# Patient Record
Sex: FEMALE | Race: WHITE | Employment: OTHER | URBAN - NONMETROPOLITAN AREA
[De-identification: names, ages, dates, MRNs, and addresses within clinical notes are randomized per-mention and may not be internally consistent; named-entity substitution may affect disease eponyms.]

---

## 2017-03-07 ENCOUNTER — TELEPHONE (OUTPATIENT)
Dept: AUDIOLOGY | Age: 82
End: 2017-03-07

## 2019-02-13 ENCOUNTER — APPOINTMENT (OUTPATIENT)
Dept: CT IMAGING | Age: 84
End: 2019-02-13
Payer: MEDICARE

## 2019-02-13 ENCOUNTER — APPOINTMENT (OUTPATIENT)
Dept: GENERAL RADIOLOGY | Age: 84
End: 2019-02-13
Payer: MEDICARE

## 2019-02-13 ENCOUNTER — HOSPITAL ENCOUNTER (EMERGENCY)
Age: 84
Discharge: HOME | End: 2019-02-13
Payer: MEDICARE

## 2019-02-13 VITALS
OXYGEN SATURATION: 95 % | HEIGHT: 67 IN | BODY MASS INDEX: 23.07 KG/M2 | HEART RATE: 74 BPM | TEMPERATURE: 98 F | DIASTOLIC BLOOD PRESSURE: 54 MMHG | RESPIRATION RATE: 20 BRPM | SYSTOLIC BLOOD PRESSURE: 104 MMHG | WEIGHT: 147 LBS
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
## 2019-02-13 DIAGNOSIS — K08.89 PAIN, DENTAL: ICD-10-CM

## 2019-02-13 LAB
ANION GAP SERPL CALCULATED.3IONS-SCNC: 13 MEQ/L (ref 8–16)
BACTERIA: ABNORMAL /HPF
BASOPHILS # BLD: 0.3 %
BASOPHILS ABSOLUTE: 0 THOU/MM3 (ref 0–0.1)
BILIRUBIN URINE: NEGATIVE
BLOOD, URINE: NEGATIVE
BUN BLDV-MCNC: 19 MG/DL (ref 7–22)
CALCIUM SERPL-MCNC: 9.2 MG/DL (ref 8.5–10.5)
CASTS 2: ABNORMAL /LPF
CASTS UA: ABNORMAL /LPF
CHARACTER, URINE: CLEAR
CHLORIDE BLD-SCNC: 103 MEQ/L (ref 98–111)
CO2: 24 MEQ/L (ref 23–33)
COLOR: YELLOW
CREAT SERPL-MCNC: 1 MG/DL (ref 0.4–1.2)
CRYSTALS, UA: ABNORMAL
EKG ATRIAL RATE: 83 BPM
EKG P AXIS: 48 DEGREES
EKG P-R INTERVAL: 180 MS
EKG Q-T INTERVAL: 414 MS
EKG QRS DURATION: 122 MS
EKG QTC CALCULATION (BAZETT): 486 MS
EKG R AXIS: -86 DEGREES
EKG T AXIS: -19 DEGREES
EKG VENTRICULAR RATE: 83 BPM
EOSINOPHIL # BLD: 0.2 %
EOSINOPHILS ABSOLUTE: 0 THOU/MM3 (ref 0–0.4)
EPITHELIAL CELLS, UA: ABNORMAL /HPF
ERYTHROCYTE [DISTWIDTH] IN BLOOD BY AUTOMATED COUNT: 12.5 % (ref 11.5–14.5)
ERYTHROCYTE [DISTWIDTH] IN BLOOD BY AUTOMATED COUNT: 41 FL (ref 35–45)
GFR SERPL CREATININE-BSD FRML MDRD: 53 ML/MIN/1.73M2
GLUCOSE BLD-MCNC: 149 MG/DL (ref 70–108)
GLUCOSE URINE: NEGATIVE MG/DL
HCT VFR BLD CALC: 36.1 % (ref 37–47)
HEMOGLOBIN: 12 GM/DL (ref 12–16)
IMMATURE GRANS (ABS): 0.08 THOU/MM3 (ref 0–0.07)
IMMATURE GRANULOCYTES: 0.6 %
KETONES, URINE: NEGATIVE
LEUKOCYTE ESTERASE, URINE: NEGATIVE
LYMPHOCYTES # BLD: 5.4 %
LYMPHOCYTES ABSOLUTE: 0.8 THOU/MM3 (ref 1–4.8)
MCH RBC QN AUTO: 30 PG (ref 26–33)
MCHC RBC AUTO-ENTMCNC: 33.2 GM/DL (ref 32.2–35.5)
MCV RBC AUTO: 90.3 FL (ref 81–99)
MISCELLANEOUS 2: ABNORMAL
MONOCYTES # BLD: 10.9 %
MONOCYTES ABSOLUTE: 1.6 THOU/MM3 (ref 0.4–1.3)
NITRITE, URINE: NEGATIVE
NUCLEATED RED BLOOD CELLS: 0 /100 WBC
OSMOLALITY CALCULATION: 284.5 MOSMOL/KG (ref 275–300)
PH UA: 6
PLATELET # BLD: 178 THOU/MM3 (ref 130–400)
PMV BLD AUTO: 8.9 FL (ref 9.4–12.4)
POTASSIUM SERPL-SCNC: 4 MEQ/L (ref 3.5–5.2)
PROTEIN UA: 30
RBC # BLD: 4 MILL/MM3 (ref 4.2–5.4)
RBC URINE: ABNORMAL /HPF
RENAL EPITHELIAL, UA: ABNORMAL
SEG NEUTROPHILS: 82.6 %
SEGMENTED NEUTROPHILS ABSOLUTE COUNT: 11.9 THOU/MM3 (ref 1.8–7.7)
SODIUM BLD-SCNC: 140 MEQ/L (ref 135–145)
SPECIFIC GRAVITY, URINE: 1.01 (ref 1–1.03)
UROBILINOGEN, URINE: 0.2 EU/DL
WBC # BLD: 14.4 THOU/MM3 (ref 4.8–10.8)
WBC UA: ABNORMAL /HPF
YEAST: ABNORMAL

## 2019-02-13 PROCEDURE — 99285 EMERGENCY DEPT VISIT HI MDM: CPT

## 2019-02-13 PROCEDURE — 85025 COMPLETE CBC W/AUTO DIFF WBC: CPT

## 2019-02-13 PROCEDURE — 6370000000 HC RX 637 (ALT 250 FOR IP): Performed by: PHYSICIAN ASSISTANT

## 2019-02-13 PROCEDURE — 80048 BASIC METABOLIC PNL TOTAL CA: CPT

## 2019-02-13 PROCEDURE — 71045 X-RAY EXAM CHEST 1 VIEW: CPT

## 2019-02-13 PROCEDURE — 36415 COLL VENOUS BLD VENIPUNCTURE: CPT

## 2019-02-13 PROCEDURE — 70486 CT MAXILLOFACIAL W/O DYE: CPT

## 2019-02-13 PROCEDURE — 70450 CT HEAD/BRAIN W/O DYE: CPT

## 2019-02-13 PROCEDURE — P9612 CATHETERIZE FOR URINE SPEC: HCPCS

## 2019-02-13 PROCEDURE — 93005 ELECTROCARDIOGRAM TRACING: CPT | Performed by: PHYSICIAN ASSISTANT

## 2019-02-13 PROCEDURE — 81001 URINALYSIS AUTO W/SCOPE: CPT

## 2019-02-13 PROCEDURE — 72125 CT NECK SPINE W/O DYE: CPT

## 2019-02-13 RX ORDER — ACETAMINOPHEN 325 MG/1
650 TABLET ORAL ONCE
Status: COMPLETED | OUTPATIENT
Start: 2019-02-13 | End: 2019-02-13

## 2019-02-13 RX ORDER — PENICILLIN V POTASSIUM 500 MG/1
500 TABLET ORAL 4 TIMES DAILY
Qty: 40 TABLET | Refills: 0 | Status: SHIPPED | OUTPATIENT
Start: 2019-02-13 | End: 2019-02-23

## 2019-02-13 RX ADMIN — ACETAMINOPHEN 650 MG: 325 TABLET ORAL at 06:22

## 2019-02-13 ASSESSMENT — ENCOUNTER SYMPTOMS
DIARRHEA: 0
COUGH: 1
NAUSEA: 0
RHINORRHEA: 0
WHEEZING: 0
ABDOMINAL PAIN: 0
BACK PAIN: 0
SORE THROAT: 0
VOMITING: 0
SHORTNESS OF BREATH: 0

## 2019-02-14 PROCEDURE — 93010 ELECTROCARDIOGRAM REPORT: CPT | Performed by: INTERNAL MEDICINE

## 2020-02-07 ENCOUNTER — APPOINTMENT (OUTPATIENT)
Dept: GENERAL RADIOLOGY | Age: 85
End: 2020-02-07
Payer: MEDICARE

## 2020-02-07 ENCOUNTER — HOSPITAL ENCOUNTER (EMERGENCY)
Age: 85
Discharge: HOME | End: 2020-02-07
Attending: EMERGENCY MEDICINE
Payer: MEDICARE

## 2020-02-07 VITALS
DIASTOLIC BLOOD PRESSURE: 70 MMHG | HEART RATE: 82 BPM | HEIGHT: 65 IN | TEMPERATURE: 98.3 F | BODY MASS INDEX: 25.83 KG/M2 | RESPIRATION RATE: 17 BRPM | WEIGHT: 155 LBS | OXYGEN SATURATION: 96 % | SYSTOLIC BLOOD PRESSURE: 125 MMHG

## 2020-02-07 LAB
BACTERIA: ABNORMAL
BILIRUBIN URINE: ABNORMAL
BLOOD, URINE: NEGATIVE
CASTS: ABNORMAL /LPF
CASTS: ABNORMAL /LPF
CHARACTER, URINE: CLEAR
COLOR: ABNORMAL
CRYSTALS: ABNORMAL
EKG ATRIAL RATE: 66 BPM
EKG P AXIS: 42 DEGREES
EKG P-R INTERVAL: 198 MS
EKG Q-T INTERVAL: 448 MS
EKG QRS DURATION: 126 MS
EKG QTC CALCULATION (BAZETT): 469 MS
EKG R AXIS: -72 DEGREES
EKG T AXIS: -6 DEGREES
EKG VENTRICULAR RATE: 66 BPM
EPITHELIAL CELLS, UA: ABNORMAL /HPF
FLU A ANTIGEN: NEGATIVE
FLU B ANTIGEN: NEGATIVE
GLUCOSE, URINE: NEGATIVE MG/DL
ICTOTEST: NEGATIVE
KETONES, URINE: ABNORMAL
LEUKOCYTE EST, POC: NEGATIVE
MISCELLANEOUS LAB TEST RESULT: ABNORMAL
NITRITE, URINE: NEGATIVE
PH UA: 5 (ref 5–9)
PROTEIN UA: 30 MG/DL
RBC URINE: ABNORMAL /HPF
RENAL EPITHELIAL, UA: ABNORMAL
SPECIFIC GRAVITY UA: 1.03 (ref 1–1.03)
UROBILINOGEN, URINE: 1 EU/DL (ref 0–1)
WBC UA: ABNORMAL /HPF
YEAST: ABNORMAL

## 2020-02-07 PROCEDURE — 99284 EMERGENCY DEPT VISIT MOD MDM: CPT

## 2020-02-07 PROCEDURE — 2709999900 HC NON-CHARGEABLE SUPPLY

## 2020-02-07 PROCEDURE — 71046 X-RAY EXAM CHEST 2 VIEWS: CPT

## 2020-02-07 PROCEDURE — 93005 ELECTROCARDIOGRAM TRACING: CPT | Performed by: EMERGENCY MEDICINE

## 2020-02-07 PROCEDURE — 87804 INFLUENZA ASSAY W/OPTIC: CPT

## 2020-02-07 PROCEDURE — 81001 URINALYSIS AUTO W/SCOPE: CPT

## 2020-02-07 ASSESSMENT — ENCOUNTER SYMPTOMS
RECTAL PAIN: 0
SHORTNESS OF BREATH: 0
NAUSEA: 0
BACK PAIN: 0
CONSTIPATION: 0
COUGH: 1
VOMITING: 0
DIARRHEA: 0
EYE PAIN: 0
SINUS PAIN: 0
BLOOD IN STOOL: 0
SINUS PRESSURE: 0
ABDOMINAL PAIN: 0
CHEST TIGHTNESS: 0
WHEEZING: 1

## 2020-02-07 NOTE — ED PROVIDER NOTES
 CAROTID ENDARTERECTOMY Right 3/19/2014    COLONOSCOPY      CRANIOTOMY  5/9/15    Dr. Robert Disla  5/15    HYSTERECTOMY  1989    UPPER GASTROINTESTINAL ENDOSCOPY         MEDICATIONS   Scheduled Meds:  Continuous Infusions:  PRN Meds:. ALLERGIES:     Allergies   Allergen Reactions    Morphine Nausea And Vomiting       FAMILY HISTORY:     Family History   Problem Relation Age of Onset    High Blood Pressure Mother     Stroke Mother         TIA    Other Father         peritonitis    Cancer Father         tumor on colon       SOCIAL HISTORY:     Social History     Tobacco Use    Smoking status: Never Smoker    Smokeless tobacco: Never Used   Substance Use Topics    Alcohol use: No     Comment: occasional beer     Pt is a resident at [address] [place] [address] St:   Pt presented to ED on 2019 for evaluation after a fall. Audelia Santoyo VITAL SIGNS   CURRENT VITALS:  height is 5' 5\" (1.651 m) and weight is 155 lb (70.3 kg). Her oral temperature is 98.3 °F (36.8 °C). Her blood pressure is 128/67 and her pulse is 69. Her respiration is 20 and oxygen saturation is 96%. Temperature Range (24h):Temp: 98.3 °F (36.8 °C) Temp  Av.3 °F (36.8 °C)  Min: 98.3 °F (36.8 °C)  Max: 98.3 °F (36.8 °C)  BP Range (06W): Systolic (02IKN), SAD:919 , Min:128 , UWW:626     Diastolic (23HHI), OJM:23, Min:67, Max:67    Pulse Range (24h): Pulse  Av  Min: 69  Max: 69  Respiration Range (24h): Resp  Av  Min: 20  Max: 20  Current Pulse Ox (24h):  SpO2: 96 %  Pulse Ox Range (24h):  SpO2  Av %  Min: 96 %  Max: 96 %  Oxygen Amount and Delivery:    Vital signs are normal. Pulsoximetry is normal.    PHYSICAL EXAM:   Physical Exam  Constitutional:       General: She is not in acute distress. Appearance: Normal appearance. Cardiovascular:      Rate and Rhythm: Normal rate and regular rhythm. Heart sounds: Normal heart sounds. No murmur. No friction rub. No gallop.     Pulmonary:

## 2020-02-07 NOTE — ED NOTES
Maddie ASHBY at [facility] called and updated on pt's discharged. [facility] transport called and stated they will be to pick pt up in 15 minutes.      Jeet Fraser RN  02/07/20 [id number]

## 2020-02-07 NOTE — ED PROVIDER NOTES
[address] COMPLAINT       Chief Complaint   Patient presents with    Cough     History obtained from the patient and nursing home records. HISTORY OF PRESENT ILLNESS    HPI  Roxane Person is a 80 y.o. female who presents to the emergency department for evaluation of productive cough with intermittent yellow sputum that she states has been persistent for months, currently in no pain. She reports intermittent wheezes. Patient also report urinary frequency and bladder incontinence, described as feeling urine coming out when coughing. Patient denies fever and chills. She denies chest pain and shortness of breath. Patient denies abdominal pain, nausea, vomiting, diarrhea and constipation. She denies hematuria and dysuria. Patient has a past medical history of CVA, GERD, HLD, HTN, and Osteopenia. The patient has no other acute complaints at this time. REVIEW OF SYSTEMS   Review of Systems   Constitutional: Negative for chills, fever and unexpected weight change. HENT: Negative for congestion, ear pain, nosebleeds, sinus pressure and sinus pain. Eyes: Negative for pain. Respiratory: Positive for cough and wheezing. Negative for chest tightness and shortness of breath. Cardiovascular: Negative for chest pain. Gastrointestinal: Negative for abdominal pain, blood in stool, constipation, diarrhea, nausea, rectal pain and vomiting. Endocrine: Negative for polydipsia and polyuria. Genitourinary: Positive for frequency. Negative for dysuria, flank pain and hematuria. Urinary incontinence    Musculoskeletal: Negative for arthralgias, back pain, myalgias and neck pain. Skin: Negative for rash. Neurological: Negative for tremors, seizures, weakness and headaches. All other systems reviewed and are negative.         PAST MEDICAL AND SURGICAL HISTORY     Past Medical History:   Diagnosis Date    Arthritis     [facility]     ovarian, right breast    CVA (cerebral infarction)     01/14    [facility] 2014    Dementia [facility]     Generalized anxiety disorder 2/7/2014    GERD (gastroesophageal reflux disease)     H/O craniotomy 05/2015    Hyperlipidemia     Hypertension     Osteopenia     Stress incontinence     Unspecified cerebral artery occlusion with cerebral infarction      Past Surgical History:   Procedure Laterality Date    BREAST SURGERY Right     BREAST BIOPSY    CAROTID ENDARTERECTOMY Right 3/19/2014    COLONOSCOPY      CRANIOTOMY  5/9/15    Dr. Fatou Redding  5/15    HYSTERECTOMY  1989    UPPER GASTROINTESTINAL ENDOSCOPY           MEDICATIONS   No current facility-administered medications for this encounter.      Current Outpatient Medications:     acetaminophen (TYLENOL) 325 MG tablet, Take 2 tablets by mouth three times daily, Disp: 120 tablet, Rfl: 3    haloperidol lactate (HALDOL) 5 MG/ML injection, Inject 1 mL into the muscle every 6 hours as needed for Agitation, Disp: 2 mL, Rfl: 0    QUEtiapine (SEROQUEL) 50 MG tablet, Take 1 tablet by mouth every morning, Disp: 60 tablet, Rfl: 3    QUEtiapine (SEROQUEL) 25 MG tablet, Take 3 tablets by mouth nightly, Disp: 60 tablet, Rfl: 3    diclofenac sodium 1 % GEL, Apply 2 g topically 3 times daily, Disp: 1 Tube, Rfl: 3    lidocaine (LIDODERM) 5 %, Place 2 patches onto the skin daily 12 hours on, 12 hours off., Disp: 30 patch, Rfl: 0    donepezil (ARICEPT) 10 MG tablet, Take 1 tablet by mouth nightly, Disp: 30 tablet, Rfl: 3    atorvastatin (LIPITOR) 10 MG tablet, Take 1 tablet by mouth nightly, Disp: , Rfl:     LACTOBACILLUS PO, Take 1 tablet by mouth daily, Disp: , Rfl:     metoprolol (LOPRESSOR) 25 MG tablet, Take 25 mg by mouth daily, Disp: , Rfl:     senna (SENOKOT) 8.6 MG tablet, Take 2 tablets by mouth nightly, Disp: , Rfl:     vitamin D (CHOLECALCIFEROL) 400 UNITS TABS tablet, Take 400 Units by mouth daily, Disp: , Rfl:     Cranberry 450 MG CAPS, Take by mouth 2 times daily, Disp: , Rfl:     Polyethylene Glycol 3350 (MIRALAX PO), Take by mouth daily as needed, Disp: , Rfl:     ferrous sulfate 325 (65 FE) MG tablet, Take 325 mg by mouth daily (with breakfast), Disp: , Rfl:     omeprazole (PRILOSEC) 20 MG capsule, Take 20 mg by mouth daily To administer: mix with orange or apple juice then flush with more juice after administration , Disp: , Rfl:     sertraline (ZOLOFT) 100 MG tablet,  Take 100 mg by mouth nightly , Disp: , Rfl:     bisacodyl (DULCOLAX) 10 MG suppository, Insert rectally every 24 hours PRN constipation, Disp: , Rfl:     traZODone (DESYREL) 50 MG tablet, Take 1 tablet by mouth nightly. (Patient taking differently: by PEG Tube route 25-50mg daily PRN and QHS PRN), Disp: 90 tablet, Rfl: 3    docusate sodium (COLACE, DULCOLAX) 100 MG CAPS, Take 100 mg by mouth 2 times daily. (Patient taking differently: Take 150 mg by mouth as needed ), Disp: , Rfl:     Multiple Vitamins-Minerals (THERAPEUTIC MULTIVITAMIN-MINERALS) tablet, Take 1 tablet by mouth daily. , Disp: , Rfl:       SOCIAL HISTORY     Social History     Patient does not qualify to have social determinant information on file (likely too young). Social History Narrative    Not on file     Social History     Tobacco Use    Smoking status: Never Smoker    Smokeless tobacco: Never Used   Substance Use Topics    Alcohol use: No     Comment: occasional beer    Drug use: No         ALLERGIES     Allergies   Allergen Reactions    Morphine Nausea And Vomiting         FAMILY HISTORY     Family History   Problem Relation Age of Onset    High Blood Pressure Mother     Stroke Mother         TIA    Other Father         peritonitis    Cancer Father         tumor on colon         PREVIOUS RECORDS   Previous records reviewed: Patient was evaluated 2/13/19 following at fall at the [facility].    Patient was evaluated in the ED on

## 2023-07-10 ENCOUNTER — HOSPITAL ENCOUNTER (OUTPATIENT)
Dept: AUDIOLOGY | Age: 88
Discharge: HOME | End: 2023-07-10
Payer: MEDICARE

## 2023-07-10 PROCEDURE — 92557 COMPREHENSIVE HEARING TEST: CPT | Performed by: AUDIOLOGIST

## 2023-07-10 PROCEDURE — 92567 TYMPANOMETRY: CPT | Performed by: AUDIOLOGIST

## 2023-07-10 NOTE — PROGRESS NOTES
AUDIOLOGICAL EVALUATION      REASON FOR TESTING: Audiometric evaluation per the request of Dr. Antoinette Huber, due to the diagnosis of bilateral hearing loss. Ashley Zelaya was accompanied to today's appointment by her  from her assisted living facility [facility]. She denied tinnitus, dizziness and aural pressure. Patient states that she has tried a BTE hearing aid and an ITC. She prefers the [facility] because it is easier for her to place in the ear. She reports having a stroke and it caused poor mobility of her left arm and hand. OTOSCOPY: Moderate amount of cerumen without visualization of the tympanic membrane for the left ear. Cerumen management was performed and minimal cerumen remained in the canal with slight redness. Minimal non-occluding cerumen with healthy appearing tympanic membrane for the right ear. AUDIOGRAM          Reliability: Good    COMMENTS:  Moderate to severe hearing loss for the left ear. Moderate to profound hearing loss for the right ear . Speech reception threshold was obtained at 70 dBHL for the left ear and 60 dBHL for the right ear. Word recognition ability is poor 36% for the left ear and 24% for the right. Tympanometry revealed normal peak pressure and normal middle ear compliance for both ears. NEW HEARING AID CONSULTATION:  Available hearing aid styles, technologies and options were discussed. The patient is interested in pursuing [place]. A right earmold impression was taken without incidence. Patients power of , Araceli Simmonds her son will be contacted to approve purchase. RECOMMENDATION(S):   1- Continue care with Dr. Antoinette Huber, as scheduled. 2- Binaural amplification was recommended; however due to patient's concerns of cost only a right hearing aid will be pursued after approved by her son, Araceli Simmonds. 3- Repeat audiogram and tympanogram if any changes are noted in hearing ability.      Testing completed by ANANYA Fitch under the direct supervision of Ca Monte

## 2023-07-24 ENCOUNTER — TELEPHONE (OUTPATIENT)
Dept: AUDIOLOGY | Age: 88
End: 2023-07-24

## 2024-01-08 ENCOUNTER — HOSPITAL ENCOUNTER (INPATIENT)
Age: 89
LOS: 2 days | Discharge: HOME | DRG: 689 | End: 2024-01-10
Attending: FAMILY MEDICINE | Admitting: INTERNAL MEDICINE
Payer: MEDICARE

## 2024-01-08 ENCOUNTER — APPOINTMENT (OUTPATIENT)
Dept: GENERAL RADIOLOGY | Age: 89
DRG: 689 | End: 2024-01-08
Payer: MEDICARE

## 2024-01-08 DIAGNOSIS — N39.0 COMPLICATED URINARY TRACT INFECTION: Primary | ICD-10-CM

## 2024-01-08 LAB
ALBUMIN SERPL BCG-MCNC: 4 G/DL (ref 3.5–5.1)
ALP SERPL-CCNC: 72 U/L (ref 38–126)
ALT SERPL W/O P-5'-P-CCNC: 13 U/L (ref 11–66)
ANION GAP SERPL CALC-SCNC: 12 MEQ/L (ref 8–16)
AST SERPL-CCNC: 22 U/L (ref 5–40)
BACTERIA URNS QL MICRO: ABNORMAL /HPF
BASOPHILS ABSOLUTE: 0.1 THOU/MM3 (ref 0–0.1)
BASOPHILS NFR BLD AUTO: 0.6 %
BILIRUB SERPL-MCNC: 0.2 MG/DL (ref 0.3–1.2)
BILIRUB UR QL STRIP.AUTO: NEGATIVE
BUN SERPL-MCNC: 16 MG/DL (ref 7–22)
CALCIUM SERPL-MCNC: 9.1 MG/DL (ref 8.5–10.5)
CASTS #/AREA URNS LPF: ABNORMAL /LPF
CASTS 2: ABNORMAL /LPF
CHARACTER UR: ABNORMAL
CHLORIDE SERPL-SCNC: 105 MEQ/L (ref 98–111)
CO2 SERPL-SCNC: 23 MEQ/L (ref 23–33)
COLOR: YELLOW
CREAT SERPL-MCNC: 0.9 MG/DL (ref 0.4–1.2)
CRYSTALS URNS MICRO: ABNORMAL
DEPRECATED RDW RBC AUTO: 42.4 FL (ref 35–45)
EOSINOPHIL NFR BLD AUTO: 5.3 %
EOSINOPHILS ABSOLUTE: 0.5 THOU/MM3 (ref 0–0.4)
EPITHELIAL CELLS, UA: ABNORMAL /HPF
ERYTHROCYTE [DISTWIDTH] IN BLOOD BY AUTOMATED COUNT: 12.8 % (ref 11.5–14.5)
FLUAV RNA RESP QL NAA+PROBE: NOT DETECTED
FLUBV RNA RESP QL NAA+PROBE: NOT DETECTED
GFR SERPL CREATININE-BSD FRML MDRD: 60 ML/MIN/1.73M2
GLUCOSE SERPL-MCNC: 116 MG/DL (ref 70–108)
GLUCOSE UR QL STRIP.AUTO: NEGATIVE MG/DL
HCT VFR BLD AUTO: 37.3 % (ref 37–47)
HGB BLD-MCNC: 12.4 GM/DL (ref 12–16)
HGB UR QL STRIP.AUTO: NEGATIVE
IMM GRANULOCYTES # BLD AUTO: 0.05 THOU/MM3 (ref 0–0.07)
IMM GRANULOCYTES NFR BLD AUTO: 0.5 %
KETONES UR QL STRIP.AUTO: NEGATIVE
LACTIC ACID, SEPSIS: 1.8 MMOL/L (ref 0.5–1.9)
LYMPHOCYTES ABSOLUTE: 1.9 THOU/MM3 (ref 1–4.8)
LYMPHOCYTES NFR BLD AUTO: 19.7 %
MCH RBC QN AUTO: 30.2 PG (ref 26–33)
MCHC RBC AUTO-ENTMCNC: 33.2 GM/DL (ref 32.2–35.5)
MCV RBC AUTO: 91 FL (ref 81–99)
MISCELLANEOUS 2: ABNORMAL
MONOCYTES ABSOLUTE: 0.9 THOU/MM3 (ref 0.4–1.3)
MONOCYTES NFR BLD AUTO: 9.2 %
NEUTROPHILS NFR BLD AUTO: 64.7 %
NITRITE UR QL STRIP: POSITIVE
NRBC BLD AUTO-RTO: 0 /100 WBC
OSMOLALITY SERPL CALC.SUM OF ELEC: 281.6 MOSMOL/KG (ref 275–300)
PH UR STRIP.AUTO: 5.5 [PH] (ref 5–9)
PLATELET # BLD AUTO: 217 THOU/MM3 (ref 130–400)
PMV BLD AUTO: 8.6 FL (ref 9.4–12.4)
POTASSIUM SERPL-SCNC: 4 MEQ/L (ref 3.5–5.2)
PROCALCITONIN SERPL IA-MCNC: 0.05 NG/ML (ref 0.01–0.09)
PROT SERPL-MCNC: 6.3 G/DL (ref 6.1–8)
PROT UR STRIP.AUTO-MCNC: ABNORMAL MG/DL
RBC # BLD AUTO: 4.1 MILL/MM3 (ref 4.2–5.4)
RBC URINE: ABNORMAL /HPF
REASON FOR REJECTION: NORMAL
REJECTED TEST: NORMAL
RENAL EPI CELLS #/AREA URNS HPF: ABNORMAL /[HPF]
SARS-COV-2 RNA RESP QL NAA+PROBE: NOT DETECTED
SEGMENTED NEUTROPHILS ABSOLUTE COUNT: 6.2 THOU/MM3 (ref 1.8–7.7)
SODIUM SERPL-SCNC: 140 MEQ/L (ref 135–145)
SP GR UR REFRACT.AUTO: 1.02 (ref 1–1.03)
UROBILINOGEN, URINE: 0.2 EU/DL (ref 0–1)
WBC # BLD AUTO: 9.6 THOU/MM3 (ref 4.8–10.8)
WBC #/AREA URNS HPF: > 100 /HPF
WBC #/AREA URNS HPF: ABNORMAL /[HPF]
YEAST LIKE FUNGI URNS QL MICRO: ABNORMAL

## 2024-01-08 PROCEDURE — 87077 CULTURE AEROBIC IDENTIFY: CPT

## 2024-01-08 PROCEDURE — 87186 SC STD MICRODIL/AGAR DIL: CPT

## 2024-01-08 PROCEDURE — 6360000002 HC RX W HCPCS: Performed by: STUDENT IN AN ORGANIZED HEALTH CARE EDUCATION/TRAINING PROGRAM

## 2024-01-08 PROCEDURE — 36415 COLL VENOUS BLD VENIPUNCTURE: CPT

## 2024-01-08 PROCEDURE — 51701 INSERT BLADDER CATHETER: CPT

## 2024-01-08 PROCEDURE — 2580000003 HC RX 258: Performed by: STUDENT IN AN ORGANIZED HEALTH CARE EDUCATION/TRAINING PROGRAM

## 2024-01-08 PROCEDURE — 87086 URINE CULTURE/COLONY COUNT: CPT

## 2024-01-08 PROCEDURE — 87040 BLOOD CULTURE FOR BACTERIA: CPT

## 2024-01-08 PROCEDURE — 71046 X-RAY EXAM CHEST 2 VIEWS: CPT

## 2024-01-08 PROCEDURE — 6360000002 HC RX W HCPCS: Performed by: INTERNAL MEDICINE

## 2024-01-08 PROCEDURE — 1200000000 HC SEMI PRIVATE

## 2024-01-08 PROCEDURE — 87636 SARSCOV2 & INF A&B AMP PRB: CPT

## 2024-01-08 PROCEDURE — 99285 EMERGENCY DEPT VISIT HI MDM: CPT

## 2024-01-08 PROCEDURE — 84145 PROCALCITONIN (PCT): CPT

## 2024-01-08 PROCEDURE — 2500000003 HC RX 250 WO HCPCS: Performed by: INTERNAL MEDICINE

## 2024-01-08 PROCEDURE — 2580000003 HC RX 258: Performed by: INTERNAL MEDICINE

## 2024-01-08 PROCEDURE — 83605 ASSAY OF LACTIC ACID: CPT

## 2024-01-08 PROCEDURE — 80053 COMPREHEN METABOLIC PANEL: CPT

## 2024-01-08 PROCEDURE — 81001 URINALYSIS AUTO W/SCOPE: CPT

## 2024-01-08 PROCEDURE — 93005 ELECTROCARDIOGRAM TRACING: CPT | Performed by: STUDENT IN AN ORGANIZED HEALTH CARE EDUCATION/TRAINING PROGRAM

## 2024-01-08 PROCEDURE — 6370000000 HC RX 637 (ALT 250 FOR IP): Performed by: INTERNAL MEDICINE

## 2024-01-08 PROCEDURE — 85025 COMPLETE CBC W/AUTO DIFF WBC: CPT

## 2024-01-08 RX ORDER — SODIUM CHLORIDE 0.9 % (FLUSH) 0.9 %
5-40 SYRINGE (ML) INJECTION EVERY 12 HOURS SCHEDULED
Status: DISCONTINUED | OUTPATIENT
Start: 2024-01-08 | End: 2024-01-10 | Stop reason: HOSPADM

## 2024-01-08 RX ORDER — ACETAMINOPHEN 325 MG/1
325 TABLET ORAL 2 TIMES DAILY
Status: DISCONTINUED | OUTPATIENT
Start: 2024-01-08 | End: 2024-01-10 | Stop reason: HOSPADM

## 2024-01-08 RX ORDER — COVID-19 ANTIGEN TEST
1 KIT MISCELLANEOUS 2 TIMES DAILY
Status: ON HOLD | COMMUNITY
End: 2024-01-10

## 2024-01-08 RX ORDER — QUETIAPINE FUMARATE 100 MG/1
100 TABLET, FILM COATED ORAL
Status: DISCONTINUED | OUTPATIENT
Start: 2024-01-08 | End: 2024-01-10 | Stop reason: HOSPADM

## 2024-01-08 RX ORDER — BISACODYL 10 MG
10 SUPPOSITORY, RECTAL RECTAL DAILY PRN
Status: DISCONTINUED | OUTPATIENT
Start: 2024-01-08 | End: 2024-01-10 | Stop reason: HOSPADM

## 2024-01-08 RX ORDER — ATORVASTATIN CALCIUM 10 MG/1
10 TABLET, FILM COATED ORAL NIGHTLY
Status: DISCONTINUED | OUTPATIENT
Start: 2024-01-08 | End: 2024-01-10 | Stop reason: HOSPADM

## 2024-01-08 RX ORDER — SODIUM CHLORIDE 0.9 % (FLUSH) 0.9 %
5-40 SYRINGE (ML) INJECTION PRN
Status: DISCONTINUED | OUTPATIENT
Start: 2024-01-08 | End: 2024-01-10 | Stop reason: HOSPADM

## 2024-01-08 RX ORDER — ACETAMINOPHEN 325 MG/1
650 TABLET ORAL EVERY 4 HOURS PRN
COMMUNITY

## 2024-01-08 RX ORDER — ACETAMINOPHEN 325 MG/1
325 TABLET ORAL 2 TIMES DAILY
COMMUNITY

## 2024-01-08 RX ORDER — TRAZODONE HYDROCHLORIDE 50 MG/1
50 TABLET ORAL NIGHTLY
Status: DISCONTINUED | OUTPATIENT
Start: 2024-01-08 | End: 2024-01-10 | Stop reason: HOSPADM

## 2024-01-08 RX ORDER — QUETIAPINE FUMARATE 100 MG/1
100 TABLET, FILM COATED ORAL
COMMUNITY

## 2024-01-08 RX ORDER — DONEPEZIL HYDROCHLORIDE 10 MG/1
20 TABLET, FILM COATED ORAL NIGHTLY
Status: DISCONTINUED | OUTPATIENT
Start: 2024-01-08 | End: 2024-01-10 | Stop reason: HOSPADM

## 2024-01-08 RX ORDER — SERTRALINE HYDROCHLORIDE 100 MG/1
100 TABLET, FILM COATED ORAL NIGHTLY
Status: DISCONTINUED | OUTPATIENT
Start: 2024-01-08 | End: 2024-01-10 | Stop reason: HOSPADM

## 2024-01-08 RX ORDER — ONDANSETRON 2 MG/ML
4 INJECTION INTRAMUSCULAR; INTRAVENOUS EVERY 6 HOURS PRN
Status: DISCONTINUED | OUTPATIENT
Start: 2024-01-08 | End: 2024-01-10 | Stop reason: HOSPADM

## 2024-01-08 RX ORDER — CARBOXYMETHYLCELLULOSE SODIUM 5 MG/ML
1 SOLUTION/ DROPS OPHTHALMIC 4 TIMES DAILY
COMMUNITY

## 2024-01-08 RX ORDER — LACTOBACILLUS RHAMNOSUS GG 10B CELL
1 CAPSULE ORAL DAILY
Status: DISCONTINUED | OUTPATIENT
Start: 2024-01-09 | End: 2024-01-10 | Stop reason: HOSPADM

## 2024-01-08 RX ORDER — ONDANSETRON 4 MG/1
4 TABLET, ORALLY DISINTEGRATING ORAL EVERY 8 HOURS PRN
Status: DISCONTINUED | OUTPATIENT
Start: 2024-01-08 | End: 2024-01-10 | Stop reason: HOSPADM

## 2024-01-08 RX ORDER — AMLODIPINE BESYLATE 10 MG/1
10 TABLET ORAL DAILY
Status: ON HOLD | COMMUNITY
End: 2024-01-10

## 2024-01-08 RX ORDER — AMPICILLIN TRIHYDRATE 500 MG
1 CAPSULE ORAL DAILY
Status: DISCONTINUED | OUTPATIENT
Start: 2024-01-08 | End: 2024-01-08 | Stop reason: CLARIF

## 2024-01-08 RX ORDER — QUETIAPINE FUMARATE 25 MG/1
50 TABLET, FILM COATED ORAL EVERY MORNING
Status: DISCONTINUED | OUTPATIENT
Start: 2024-01-09 | End: 2024-01-10 | Stop reason: HOSPADM

## 2024-01-08 RX ORDER — MULTIVITAMIN WITH IRON
1 TABLET ORAL DAILY
Status: DISCONTINUED | OUTPATIENT
Start: 2024-01-09 | End: 2024-01-10 | Stop reason: HOSPADM

## 2024-01-08 RX ORDER — OXYBUTYNIN CHLORIDE 5 MG/1
5 TABLET ORAL 2 TIMES DAILY
Status: DISCONTINUED | OUTPATIENT
Start: 2024-01-08 | End: 2024-01-10 | Stop reason: HOSPADM

## 2024-01-08 RX ORDER — PANTOPRAZOLE SODIUM 40 MG/1
40 TABLET, DELAYED RELEASE ORAL
Status: DISCONTINUED | OUTPATIENT
Start: 2024-01-09 | End: 2024-01-10 | Stop reason: HOSPADM

## 2024-01-08 RX ORDER — ACETAMINOPHEN 650 MG/1
650 SUPPOSITORY RECTAL EVERY 6 HOURS PRN
Status: DISCONTINUED | OUTPATIENT
Start: 2024-01-08 | End: 2024-01-10 | Stop reason: HOSPADM

## 2024-01-08 RX ORDER — ACETAMINOPHEN 325 MG/1
650 TABLET ORAL EVERY 6 HOURS PRN
Status: DISCONTINUED | OUTPATIENT
Start: 2024-01-08 | End: 2024-01-10 | Stop reason: HOSPADM

## 2024-01-08 RX ORDER — ENOXAPARIN SODIUM 100 MG/ML
40 INJECTION SUBCUTANEOUS DAILY
Status: DISCONTINUED | OUTPATIENT
Start: 2024-01-08 | End: 2024-01-10 | Stop reason: HOSPADM

## 2024-01-08 RX ORDER — DONEPEZIL HYDROCHLORIDE 23 MG/1
23 TABLET, FILM COATED ORAL NIGHTLY
COMMUNITY

## 2024-01-08 RX ORDER — POLYETHYLENE GLYCOL 3350 17 G/17G
17 POWDER, FOR SOLUTION ORAL DAILY PRN
Status: DISCONTINUED | OUTPATIENT
Start: 2024-01-08 | End: 2024-01-10 | Stop reason: HOSPADM

## 2024-01-08 RX ORDER — ACETAMINOPHEN 325 MG/1
650 TABLET ORAL EVERY 4 HOURS PRN
Status: DISCONTINUED | OUTPATIENT
Start: 2024-01-08 | End: 2024-01-08 | Stop reason: SDUPTHER

## 2024-01-08 RX ORDER — TRAZODONE HYDROCHLORIDE 50 MG/1
50 TABLET ORAL NIGHTLY
COMMUNITY

## 2024-01-08 RX ORDER — OXYBUTYNIN CHLORIDE 5 MG/1
5 TABLET ORAL 2 TIMES DAILY
COMMUNITY

## 2024-01-08 RX ORDER — ARTIFICIAL TEARS 1; 2; 3 MG/ML; MG/ML; MG/ML
1 SOLUTION/ DROPS OPHTHALMIC 4 TIMES DAILY
Status: DISCONTINUED | OUTPATIENT
Start: 2024-01-08 | End: 2024-01-10 | Stop reason: HOSPADM

## 2024-01-08 RX ORDER — NYSTATIN 10B UNIT
1 POWDER (EA) MISCELLANEOUS 3 TIMES DAILY
COMMUNITY

## 2024-01-08 RX ORDER — SODIUM CHLORIDE 9 MG/ML
INJECTION, SOLUTION INTRAVENOUS PRN
Status: DISCONTINUED | OUTPATIENT
Start: 2024-01-08 | End: 2024-01-10 | Stop reason: HOSPADM

## 2024-01-08 RX ORDER — SODIUM CHLORIDE 9 MG/ML
INJECTION, SOLUTION INTRAVENOUS CONTINUOUS
Status: DISCONTINUED | OUTPATIENT
Start: 2024-01-08 | End: 2024-01-09

## 2024-01-08 RX ORDER — FERROUS SULFATE 325(65) MG
325 TABLET ORAL
Status: DISCONTINUED | OUTPATIENT
Start: 2024-01-09 | End: 2024-01-10 | Stop reason: HOSPADM

## 2024-01-08 RX ADMIN — ATORVASTATIN CALCIUM 10 MG: 10 TABLET, FILM COATED ORAL at 20:05

## 2024-01-08 RX ADMIN — ACETAMINOPHEN 325 MG: 325 TABLET ORAL at 20:04

## 2024-01-08 RX ADMIN — OXYBUTYNIN CHLORIDE 5 MG: 5 TABLET ORAL at 20:09

## 2024-01-08 RX ADMIN — SERTRALINE 100 MG: 100 TABLET, FILM COATED ORAL at 20:08

## 2024-01-08 RX ADMIN — DEXTRAN 70, GLYCERIN, HYPROMELLOSE 1 DROP: 1; 2; 3 SOLUTION/ DROPS OPHTHALMIC at 20:05

## 2024-01-08 RX ADMIN — MICONAZOLE NITRATE: 2 POWDER TOPICAL at 20:07

## 2024-01-08 RX ADMIN — QUETIAPINE FUMARATE 100 MG: 100 TABLET ORAL at 20:08

## 2024-01-08 RX ADMIN — DONEPEZIL HYDROCHLORIDE 20 MG: 10 TABLET, FILM COATED ORAL at 20:07

## 2024-01-08 RX ADMIN — TRAZODONE HYDROCHLORIDE 50 MG: 50 TABLET ORAL at 22:11

## 2024-01-08 RX ADMIN — SODIUM CHLORIDE: 9 INJECTION, SOLUTION INTRAVENOUS at 19:09

## 2024-01-08 RX ADMIN — CEFTRIAXONE SODIUM 1000 MG: 1 INJECTION, POWDER, FOR SOLUTION INTRAMUSCULAR; INTRAVENOUS at 15:38

## 2024-01-08 RX ADMIN — ENOXAPARIN SODIUM 40 MG: 100 INJECTION SUBCUTANEOUS at 20:07

## 2024-01-08 ASSESSMENT — PAIN - FUNCTIONAL ASSESSMENT: PAIN_FUNCTIONAL_ASSESSMENT: NONE - DENIES PAIN

## 2024-01-08 NOTE — ED NOTES
Pt to be transported to [facility] via wheelchair. Spoke with 6a RN prior to departure. VSS no acute distress noted at this time.

## 2024-01-08 NOTE — H&P
MD Nataliya   Donepezil HCl (ARICEPT) 23 MG TABS tablet Take 1 tablet by mouth nightly   Yes Nataliya Pablo MD   carboxymethylcellulose PF (LUBRICANT EYE DROPS PF) 0.5 % SOLN ophthalmic solution Place 1 drop into the left eye 4 times daily   Yes Nataliya Pablo MD   oxyBUTYnin (DITROPAN) 5 MG tablet Take 1 tablet by mouth 2 times daily   Yes Nataliya Pablo MD   QUEtiapine (SEROQUEL) 100 MG tablet Take 1 tablet by mouth nightly   Yes Nataliya Pablo MD   traZODone (DESYREL) 50 MG tablet Take 1 tablet by mouth nightly   Yes Nataliya Pablo MD   acetaminophen (TYLENOL) 325 MG tablet Take 2 tablets by mouth every 4 hours as needed for Pain or Fever   Yes Nataliya Pablo MD   Calcium Carbonate Antacid (TUMS ULTRA 1000 PO) Take 2 tablets by mouth as needed (heartburn)   Yes Nataliya Pablo MD   nystatin (MYCOSTATIN) POWD powder Apply 1 each topically 3 times daily   Yes Nataliya Pablo MD   QUEtiapine (SEROQUEL) 50 MG tablet Take 1 tablet by mouth every morning 4/20/17   Gwyn Luu MD   atorvastatin (LIPITOR) 10 MG tablet Take 1 tablet by mouth nightly 11/26/15   Nataliya Pablo MD   LACTOBACILLUS PO Take 1 tablet by mouth daily    Nataliya Pablo MD   metoprolol (LOPRESSOR) 25 MG tablet Take 1 tablet by mouth daily    Nataliya Pablo MD   senna (SENOKOT) 8.6 MG tablet Take 2 tablets by mouth nightly    Nataliya Pablo MD   vitamin D (CHOLECALCIFEROL) 400 UNITS TABS tablet Take 1 tablet by mouth daily    Nataliya Pablo MD   Cranberry 450 MG CAPS Take 1 tablet by mouth daily    Nataliya Pablo MD   ferrous sulfate 325 (65 FE) MG tablet Take 1 tablet by mouth daily (with breakfast)    Nataliya Pablo MD   omeprazole (PRILOSEC) 20 MG capsule Take 1 capsule by mouth daily To administer: mix with orange or apple juice then flush with more juice after administration    Nataliya Pablo MD   sertraline (ZOLOFT) 100 MG tablet

## 2024-01-08 NOTE — ED PROVIDER NOTES
is no abdominal tenderness.   Musculoskeletal:      Cervical back: Normal range of motion.   Skin:     General: Skin is warm and dry.      Capillary Refill: Capillary refill takes less than 2 seconds.   Neurological:      Mental Status: She is alert. She is confused.      GCS: GCS eye subscore is 4. GCS verbal subscore is 5. GCS motor subscore is 6.            FORMAL DIAGNOSTIC RESULTS     RADIOLOGY: Interpretation per the Radiologist below, if available at the time of this note (none if blank):  XR CHEST (2 VW)   Final Result   1. No acute intrathoracic process.   2. Mild stable cardiomegaly.               **This report has been created using voice recognition software. It may contain minor errors which are inherent in voice recognition technology.**      Final report electronically signed by Dr. Frantz James on 1/8/2024 2:02 PM          LABS: (none if blank)  Labs Reviewed   CBC WITH AUTO DIFFERENTIAL - Abnormal; Notable for the following components:       Result Value    RBC 4.10 (*)     MPV 8.6 (*)     Eosinophils Absolute 0.5 (*)     All other components within normal limits   URINE WITH REFLEXED MICRO - Abnormal; Notable for the following components:    Protein, UA TRACE (*)     Nitrite, Urine POSITIVE (*)     Leukocyte Esterase, Urine MODERATE (*)     Character, Urine CLOUDY (*)     All other components within normal limits   COMPREHENSIVE METABOLIC PANEL - Abnormal; Notable for the following components:    Glucose 116 (*)     Total Bilirubin 0.2 (*)     All other components within normal limits   COVID-19 & INFLUENZA COMBO   CULTURE, REFLEXED, URINE    Narrative:     Source: cath urine       Site: catheter          Current Antibiotics: not stated   CULTURE, BLOOD 1   CULTURE, BLOOD 1   LACTATE, SEPSIS   SPECIMEN REJECTION   PROCALCITONIN   ANION GAP   GLOMERULAR FILTRATION RATE, ESTIMATED   OSMOLALITY     (Any cultures that may have been sent were not resulted at the time of this patient visit)    MEDICAL

## 2024-01-08 NOTE — ED NOTES
Presents to ED from [facility] via staff transport with complaints of worsening altered mental status. Assisted Living reports pt has been more confused than normal and has been having a hard time getting around. States they have been trying to get hold of her dr, but have been unsuccessful. Upon arrival pt [place] and states \"I don't know\" when asked questions. EKG completed.

## 2024-01-08 NOTE — PROGRESS NOTES
Patient arrived to 6A15 by cart from ED. Talyarenan boone used to transfer patient to bathroom. Voided small amount. Foul smelling urine. Patient alert to name only. States  is 3 years earlier. Denies pain. Bed alarm on. Call light within reach.

## 2024-01-09 LAB
ANION GAP SERPL CALC-SCNC: 11 MEQ/L (ref 8–16)
BACTERIA UR CULT: ABNORMAL
BASOPHILS ABSOLUTE: 0.1 THOU/MM3 (ref 0–0.1)
BASOPHILS NFR BLD AUTO: 1 %
BUN SERPL-MCNC: 12 MG/DL (ref 7–22)
CALCIUM SERPL-MCNC: 8.7 MG/DL (ref 8.5–10.5)
CHLORIDE SERPL-SCNC: 108 MEQ/L (ref 98–111)
CO2 SERPL-SCNC: 22 MEQ/L (ref 23–33)
CREAT SERPL-MCNC: 0.8 MG/DL (ref 0.4–1.2)
DEPRECATED RDW RBC AUTO: 41.4 FL (ref 35–45)
EKG ATRIAL RATE: 65 BPM
EKG P AXIS: 35 DEGREES
EKG P-R INTERVAL: 204 MS
EKG Q-T INTERVAL: 382 MS
EKG QRS DURATION: 92 MS
EKG QTC CALCULATION (BAZETT): 397 MS
EKG R AXIS: -57 DEGREES
EKG T AXIS: 25 DEGREES
EKG VENTRICULAR RATE: 65 BPM
EOSINOPHIL NFR BLD AUTO: 6.5 %
EOSINOPHILS ABSOLUTE: 0.5 THOU/MM3 (ref 0–0.4)
ERYTHROCYTE [DISTWIDTH] IN BLOOD BY AUTOMATED COUNT: 12.6 % (ref 11.5–14.5)
GFR SERPL CREATININE-BSD FRML MDRD: > 60 ML/MIN/1.73M2
GLUCOSE SERPL-MCNC: 93 MG/DL (ref 70–108)
HCT VFR BLD AUTO: 36.5 % (ref 37–47)
HGB BLD-MCNC: 12.4 GM/DL (ref 12–16)
IMM GRANULOCYTES # BLD AUTO: 0.04 THOU/MM3 (ref 0–0.07)
IMM GRANULOCYTES NFR BLD AUTO: 0.6 %
LYMPHOCYTES ABSOLUTE: 1.8 THOU/MM3 (ref 1–4.8)
LYMPHOCYTES NFR BLD AUTO: 24.9 %
MCH RBC QN AUTO: 30.6 PG (ref 26–33)
MCHC RBC AUTO-ENTMCNC: 34 GM/DL (ref 32.2–35.5)
MCV RBC AUTO: 90.1 FL (ref 81–99)
MONOCYTES ABSOLUTE: 0.7 THOU/MM3 (ref 0.4–1.3)
MONOCYTES NFR BLD AUTO: 9.5 %
NEUTROPHILS NFR BLD AUTO: 57.5 %
NRBC BLD AUTO-RTO: 0 /100 WBC
ORGANISM: ABNORMAL
PLATELET # BLD AUTO: 202 THOU/MM3 (ref 130–400)
PMV BLD AUTO: 8.4 FL (ref 9.4–12.4)
POTASSIUM SERPL-SCNC: 4.2 MEQ/L (ref 3.5–5.2)
RBC # BLD AUTO: 4.05 MILL/MM3 (ref 4.2–5.4)
SEGMENTED NEUTROPHILS ABSOLUTE COUNT: 4.1 THOU/MM3 (ref 1.8–7.7)
SODIUM SERPL-SCNC: 141 MEQ/L (ref 135–145)
WBC # BLD AUTO: 7.1 THOU/MM3 (ref 4.8–10.8)

## 2024-01-09 PROCEDURE — 1200000000 HC SEMI PRIVATE

## 2024-01-09 PROCEDURE — 2580000003 HC RX 258: Performed by: INTERNAL MEDICINE

## 2024-01-09 PROCEDURE — 6370000000 HC RX 637 (ALT 250 FOR IP): Performed by: INTERNAL MEDICINE

## 2024-01-09 PROCEDURE — 93010 ELECTROCARDIOGRAM REPORT: CPT | Performed by: INTERNAL MEDICINE

## 2024-01-09 PROCEDURE — 6360000002 HC RX W HCPCS: Performed by: INTERNAL MEDICINE

## 2024-01-09 PROCEDURE — 85025 COMPLETE CBC W/AUTO DIFF WBC: CPT

## 2024-01-09 PROCEDURE — 36415 COLL VENOUS BLD VENIPUNCTURE: CPT

## 2024-01-09 PROCEDURE — 80048 BASIC METABOLIC PNL TOTAL CA: CPT

## 2024-01-09 RX ADMIN — DEXTRAN 70, GLYCERIN, HYPROMELLOSE 1 DROP: 1; 2; 3 SOLUTION/ DROPS OPHTHALMIC at 20:06

## 2024-01-09 RX ADMIN — QUETIAPINE FUMARATE 50 MG: 25 TABLET ORAL at 08:16

## 2024-01-09 RX ADMIN — MICONAZOLE NITRATE: 2 POWDER TOPICAL at 20:06

## 2024-01-09 RX ADMIN — SERTRALINE 100 MG: 100 TABLET, FILM COATED ORAL at 20:05

## 2024-01-09 RX ADMIN — PANTOPRAZOLE SODIUM 40 MG: 40 TABLET, DELAYED RELEASE ORAL at 06:48

## 2024-01-09 RX ADMIN — QUETIAPINE FUMARATE 100 MG: 100 TABLET ORAL at 22:06

## 2024-01-09 RX ADMIN — Medication 1 CAPSULE: at 08:16

## 2024-01-09 RX ADMIN — ACETAMINOPHEN 325 MG: 325 TABLET ORAL at 08:16

## 2024-01-09 RX ADMIN — ACETAMINOPHEN 650 MG: 325 TABLET ORAL at 13:20

## 2024-01-09 RX ADMIN — DONEPEZIL HYDROCHLORIDE 20 MG: 10 TABLET, FILM COATED ORAL at 20:05

## 2024-01-09 RX ADMIN — DEXTRAN 70, GLYCERIN, HYPROMELLOSE 1 DROP: 1; 2; 3 SOLUTION/ DROPS OPHTHALMIC at 17:03

## 2024-01-09 RX ADMIN — MICONAZOLE NITRATE: 2 POWDER TOPICAL at 08:17

## 2024-01-09 RX ADMIN — DEXTRAN 70, GLYCERIN, HYPROMELLOSE 1 DROP: 1; 2; 3 SOLUTION/ DROPS OPHTHALMIC at 13:20

## 2024-01-09 RX ADMIN — CEFTRIAXONE SODIUM 1000 MG: 1 INJECTION, POWDER, FOR SOLUTION INTRAMUSCULAR; INTRAVENOUS at 17:03

## 2024-01-09 RX ADMIN — ATORVASTATIN CALCIUM 10 MG: 10 TABLET, FILM COATED ORAL at 20:06

## 2024-01-09 RX ADMIN — Medication 1 TABLET: at 08:16

## 2024-01-09 RX ADMIN — ACETAMINOPHEN 325 MG: 325 TABLET ORAL at 20:05

## 2024-01-09 RX ADMIN — OXYBUTYNIN CHLORIDE 5 MG: 5 TABLET ORAL at 20:05

## 2024-01-09 RX ADMIN — OXYBUTYNIN CHLORIDE 5 MG: 5 TABLET ORAL at 08:17

## 2024-01-09 RX ADMIN — FERROUS SULFATE TAB 325 MG (65 MG ELEMENTAL FE) 325 MG: 325 (65 FE) TAB at 08:16

## 2024-01-09 RX ADMIN — SODIUM CHLORIDE, PRESERVATIVE FREE 10 ML: 5 INJECTION INTRAVENOUS at 20:53

## 2024-01-09 RX ADMIN — ENOXAPARIN SODIUM 40 MG: 100 INJECTION SUBCUTANEOUS at 20:04

## 2024-01-09 RX ADMIN — DEXTRAN 70, GLYCERIN, HYPROMELLOSE 1 DROP: 1; 2; 3 SOLUTION/ DROPS OPHTHALMIC at 08:18

## 2024-01-09 ASSESSMENT — PAIN SCALES - GENERAL: PAINLEVEL_OUTOF10: 5

## 2024-01-09 NOTE — CARE COORDINATION
1/9/24, 7:25 AM EST      DISCHARGE PLANNING EVALUATION    Suma Shepherd  Admitted: 1/8/2024  Hospital Day: 1    Location: -15/015-A Reason for admit: Complicated urinary tract infection [N39.0]    Past Medical History:   Diagnosis Date    Arthritis     Cancer (HCC)     ovarian, right breast    CVA (cerebral infarction)     01/14    CVA (cerebral vascular accident) (HCC) 2014    Dementia (HCC)     Generalized anxiety disorder 2/7/2014    GERD (gastroesophageal reflux disease)     H/O craniotomy 05/2015    Hyperlipidemia     Hypertension     Osteopenia     Stress incontinence     Unspecified cerebral artery occlusion with cerebral infarction        Procedure:   1/8 CXR 1. No acute intrathoracic process.   2. Mild stable cardiomegaly.     Barriers to Discharge: From ED, urine (+), IV fluids, Lipitor, IV Rocephin, Lipitor, Aricept, Lovenox, ferrous sulfate, Ditropan, Protonix, Seroquel, Zoloft.     PCP: Gwyn Luu MD    Readmission Risk Low 0-14, Mod 15-19), High > 20: Readmission Risk Score: 12.7      Advance Directives:      Code Status: DNR-CCA   Patient's Primary Decision Maker is:        Patient Goals/Plan/Treatment Preferences: Sum[facility]. Assessment deferred to . Refer to  note. Plan to return to [facility]. Will follow.     Transportation/Food Security/Housekeeping Addressed: No issues identified.     If patient is discharged prior to next notation, then this note serves as note for discharge by case management.

## 2024-01-09 NOTE — CARE COORDINATION
1/9/24, 2:57 PM EST    DISCHARGE PLANNING EVALUATION    Call made to sonHugh, message left requesting return call.      Call made to [facility] living to update on anticipated plan for discharge tomorrow.   [facility] staff confirms plan to accept back to assisted living at discharge.

## 2024-01-09 NOTE — PROGRESS NOTES
INTERNAL MEDICINE Progress Note  1/9/2024 10:51 AM  Subjective:   Admit Date: 1/8/2024  PCP: Gwyn Luu MD  Interval History:   Late entry    Seen  No new c/o    Objective:   Vitals: /66   Pulse 71   Temp 98.4 °F (36.9 °C) (Oral)   Resp 18   Ht 1.676 m (5' 6\")   Wt 66.9 kg (147 lb 7.8 oz)   SpO2 95%   BMI 23.81 kg/m²   General appearance: alert and cooperative with exam  HEENT: Head: Normal, normocephalic, atraumatic.  Neck: no adenopathy, no carotid bruit, and no JVD  Lungs: clear to auscultation bilaterally  Heart: S1, S2 normal  Abdomen: soft, non-tender; bowel sounds normal; no masses,  no organomegaly  Extremities: extremities normal, atraumatic, no cyanosis or edema  Neurologic: Mental status: alertness: alert, orientation: person, affect: blunted and flat, thought content exhibits loose associations      Medications:   Scheduled Meds:   sodium chloride flush  5-40 mL IntraVENous 2 times per day    enoxaparin  40 mg SubCUTAneous Daily    cefTRIAXone (ROCEPHIN) IV  1,000 mg IntraVENous Q24H    acetaminophen  325 mg Oral BID    atorvastatin  10 mg Oral Nightly    artificial tears (dextran-hypromellose-glycerin)  1 drop Left Eye 4x Daily    donepezil  20 mg Oral Nightly    ferrous sulfate  325 mg Oral Daily with breakfast    lactobacillus  1 capsule Oral Daily    multivitamin  1 tablet Oral Daily    miconazole   Topical BID    oxyBUTYnin  5 mg Oral BID    pantoprazole  40 mg Oral QAM AC    QUEtiapine  100 mg Oral QHS    QUEtiapine  50 mg Oral QAM    sertraline  100 mg Oral Nightly    traZODone  50 mg Oral Nightly     Continuous Infusions:   sodium chloride      sodium chloride 75 mL/hr at 01/08/24 1909       Lab Results:   CBC:   Recent Labs     01/08/24  1342 01/09/24  0657   WBC 9.6 7.1   HGB 12.4 12.4    202     BMP:    Recent Labs     01/08/24  1407 01/09/24  0657    141   K 4.0 4.2    108   CO2 23 22*   BUN 16 12   CREATININE 0.9 0.8   GLUCOSE 116* 93     Hepatic:

## 2024-01-10 VITALS
BODY MASS INDEX: 23.7 KG/M2 | SYSTOLIC BLOOD PRESSURE: 142 MMHG | DIASTOLIC BLOOD PRESSURE: 69 MMHG | RESPIRATION RATE: 20 BRPM | HEART RATE: 79 BPM | WEIGHT: 147.49 LBS | OXYGEN SATURATION: 93 % | HEIGHT: 66 IN | TEMPERATURE: 97.8 F

## 2024-01-10 PROBLEM — N30.00 ACUTE CYSTITIS WITHOUT HEMATURIA: Status: ACTIVE | Noted: 2024-01-10

## 2024-01-10 PROCEDURE — 6370000000 HC RX 637 (ALT 250 FOR IP): Performed by: INTERNAL MEDICINE

## 2024-01-10 PROCEDURE — 2580000003 HC RX 258: Performed by: INTERNAL MEDICINE

## 2024-01-10 RX ORDER — AMLODIPINE BESYLATE 5 MG/1
5 TABLET ORAL DAILY
Qty: 30 TABLET | Refills: 3 | Status: SHIPPED | OUTPATIENT
Start: 2024-01-10

## 2024-01-10 RX ORDER — CEPHALEXIN 500 MG/1
500 CAPSULE ORAL 3 TIMES DAILY
Qty: 21 CAPSULE | Refills: 0 | Status: SHIPPED | OUTPATIENT
Start: 2024-01-10 | End: 2024-01-17

## 2024-01-10 RX ORDER — COVID-19 ANTIGEN TEST
1 KIT MISCELLANEOUS 2 TIMES DAILY PRN
Qty: 60 CAPSULE | Refills: 1 | Status: SHIPPED | OUTPATIENT
Start: 2024-01-10

## 2024-01-10 RX ADMIN — ACETAMINOPHEN 325 MG: 325 TABLET ORAL at 08:16

## 2024-01-10 RX ADMIN — Medication 1 TABLET: at 08:15

## 2024-01-10 RX ADMIN — QUETIAPINE FUMARATE 50 MG: 25 TABLET ORAL at 08:15

## 2024-01-10 RX ADMIN — MICONAZOLE NITRATE: 2 POWDER TOPICAL at 08:15

## 2024-01-10 RX ADMIN — FERROUS SULFATE TAB 325 MG (65 MG ELEMENTAL FE) 325 MG: 325 (65 FE) TAB at 08:15

## 2024-01-10 RX ADMIN — SODIUM CHLORIDE, PRESERVATIVE FREE 10 ML: 5 INJECTION INTRAVENOUS at 08:17

## 2024-01-10 RX ADMIN — DEXTRAN 70, GLYCERIN, HYPROMELLOSE 1 DROP: 1; 2; 3 SOLUTION/ DROPS OPHTHALMIC at 12:38

## 2024-01-10 RX ADMIN — Medication 1 CAPSULE: at 08:15

## 2024-01-10 RX ADMIN — DEXTRAN 70, GLYCERIN, HYPROMELLOSE 1 DROP: 1; 2; 3 SOLUTION/ DROPS OPHTHALMIC at 08:15

## 2024-01-10 RX ADMIN — OXYBUTYNIN CHLORIDE 5 MG: 5 TABLET ORAL at 08:15

## 2024-01-10 RX ADMIN — PANTOPRAZOLE SODIUM 40 MG: 40 TABLET, DELAYED RELEASE ORAL at 06:17
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
## 2024-01-10 NOTE — PROGRESS NOTES
INTERNAL MEDICINE Progress Note  1/10/2024 9:27 AM  Subjective:   Admit Date: 1/8/2024  PCP: Gwyn Luu MD  Interval History:   Seen  No new c/o    Objective:   Vitals: /61   Pulse 72   Temp 97.8 °F (36.6 °C) (Oral)   Resp 16   Ht 1.676 m (5' 6\")   Wt 66.9 kg (147 lb 7.8 oz)   SpO2 94%   BMI 23.81 kg/m²   General appearance: alert and cooperative with exam  HEENT: Normal, normocephalic, atraumatic.  Neck: no adenopathy, no carotid bruit, and no JVD  Lungs: clear to auscultation bilaterally  Heart: S1, S2 normal  Abdomen: soft, non-tender; bowel sounds normal; no masses,  no organomegaly  Extremities: extremities normal, atraumatic, no cyanosis or edema  Neurologic: alert, orientation: person, affect: blunted and flat, thought content exhibits loose associations      Medications:   Scheduled Meds:   sodium chloride flush  5-40 mL IntraVENous 2 times per day    enoxaparin  40 mg SubCUTAneous Daily    cefTRIAXone (ROCEPHIN) IV  1,000 mg IntraVENous Q24H    acetaminophen  325 mg Oral BID    atorvastatin  10 mg Oral Nightly    artificial tears (dextran-hypromellose-glycerin)  1 drop Left Eye 4x Daily    donepezil  20 mg Oral Nightly    ferrous sulfate  325 mg Oral Daily with breakfast    lactobacillus  1 capsule Oral Daily    multivitamin  1 tablet Oral Daily    miconazole   Topical BID    oxyBUTYnin  5 mg Oral BID    pantoprazole  40 mg Oral QAM AC    QUEtiapine  100 mg Oral QHS    QUEtiapine  50 mg Oral QAM    sertraline  100 mg Oral Nightly    traZODone  50 mg Oral Nightly     Continuous Infusions:   sodium chloride         Lab Results:   CBC:   Recent Labs     01/08/24  1342 01/09/24  0657   WBC 9.6 7.1   HGB 12.4 12.4    202       BMP:    Recent Labs     01/08/24  1407 01/09/24  0657    141   K 4.0 4.2    108   CO2 23 22*   BUN 16 12   CREATININE 0.9 0.8   GLUCOSE 116* 93       Hepatic:   Recent Labs     01/08/24  1407   AST 22   ALT 13   BILITOT 0.2*   ALKPHOS 72

## 2024-01-10 NOTE — CARE COORDINATION
1/10/24, 8:57 AM EST    DISCHARGE ON GOING EVALUATION    Suma Shepherd       Hospital day: 2  Location: -15/015-A Reason for admit: Complicated urinary tract infection [N39.0]   Procedure:   1/8 CXR 1. No acute intrathoracic process.   2. Mild stable cardiomegaly.       Barriers to Discharge: Urine (+) E.Coli, blood culture with no growth x 24 hours. Lipitor, IV Rocephin, Lipitor, Aricept, Lovenox, ferrous sulfate, Ditropan, Protonix, Seroquel, Zoloft.    PCP: Gwyn Luu MD  Readmission Risk Score: 13.2%  Patient Goals/Plan/Treatment Preferences: Plans return [facility]. Refer to [facility] note.

## 2024-01-10 NOTE — CARE COORDINATION
1/10/24, 11:11 AM EST    DISCHARGE PLANNING EVALUATION    Spoke with son Hugh, who confirms plan for return to [facility], requests ambulette transport, scheduled for 4:00 pm.       Confirmed plan for return to [facility].     Readmission Risk Low 0-14, Mod 15-19), High > 20: Readmission Risk Score: 13.2    Current PCP: Gwyn Luu MD  PCP verified by CM? Yes    Patient Orientation: Alert and Oriented    Patient Cognition: Alert  History Provided by:      Advance Directives:      Code Status: DNR-CCA   Patient's Primary Decision Maker is: Named in Scanned ACP Document       Discharge Planning:    Patient lives with: Other (Comment) (assisted living) Type of Home: Assisted living  Primary Care Giver:    Patient Support Systems include: Home Care Staff, Children   Current Financial resources:    Current community resources: Assisted Living  Current services prior to admission: None            Current DME:              Type of Home Care services:  Nursing Services, Safety Alert    ADLS  Prior functional level: Assistance with the following:, Bathing, Dressing, Housework, Shopping, Mobility, Toileting  Current functional level: Assistance with the following:, Bathing, Dressing, Toileting, Housework, Shopping, Mobility, Cooking    Family can provide assistance at DC: No  Would you like Case Management to discuss the discharge plan with any other family members/significant others, and if so, who? No  Plans to Return to Present Housing: Yes  Other Identified Issues/Barriers to RETURNING to current housing: can return to assisted living   Potential Assistance needed at discharge: Transportation, Intermediate Care            Potential DME:    Patient expects to discharge to: Assisted living  Plan for transportation at discharge: ambulette     Financial  Payor: MEDICARE / Plan: MEDICARE PART A AND B / Product Type: *No Product type* /     Potential assistance Purchasing Medications: No   Post-acute (PAC)

## 2024-01-10 NOTE — PLAN OF CARE
Problem: Discharge Planning  Goal: Discharge to home or other facility with appropriate resources  1/10/2024 0933 by Matilde Key RN  Outcome: Progressing  Flowsheets (Taken 1/9/2024 2123 by Bell Glass RN)  Discharge to home or other facility with appropriate resources:   Identify barriers to discharge with patient and caregiver   Identify discharge learning needs (meds, wound care, etc)   Arrange for needed discharge resources and transportation as appropriate   Arrange for interpreters to assist at discharge as needed  1/9/2024 2123 by Bell Glass RN  Outcome: Progressing  Flowsheets (Taken 1/9/2024 2123)  Discharge to home or other facility with appropriate resources:   Identify barriers to discharge with patient and caregiver   Identify discharge learning needs (meds, wound care, etc)   Arrange for needed discharge resources and transportation as appropriate   Arrange for interpreters to assist at discharge as needed     Problem: Safety - Adult  Goal: Free from fall injury  1/10/2024 0933 by Matilde Key RN  Outcome: Progressing  Flowsheets (Taken 1/9/2024 2123 by Bell Glass RN)  Free From Fall Injury:   Instruct family/caregiver on patient safety   Based on caregiver fall risk screen, instruct family/caregiver to ask for assistance with transferring infant if caregiver noted to have fall risk factors  1/9/2024 2123 by Bell Glass RN  Outcome: Progressing  Flowsheets (Taken 1/9/2024 2123)  Free From Fall Injury:   Instruct family/caregiver on patient safety   Based on caregiver fall risk screen, instruct family/caregiver to ask for assistance with transferring infant if caregiver noted to have fall risk factors     Problem: Skin/Tissue Integrity - Adult  Goal: Skin integrity remains intact  1/10/2024 0933 by Matilde Key RN  Outcome: Progressing  Flowsheets (Taken 1/9/2024 2123 by Glass, Bell, RN)  Skin Integrity Remains Intact:   Monitor for areas of 
  Problem: Discharge Planning  Goal: Discharge to home or other facility with appropriate resources  1/9/2024 1346 by Sturgeon, Cara B, RN  Outcome: Progressing  Flowsheets (Taken 1/9/2024 1346)  Discharge to home or other facility with appropriate resources:   Identify barriers to discharge with patient and caregiver   Identify discharge learning needs (meds, wound care, etc)   Arrange for needed discharge resources and transportation as appropriate   Arrange for interpreters to assist at discharge as needed     Problem: Safety - Adult  Goal: Free from fall injury  1/9/2024 1346 by Sturgeon, Cara B, RN  Outcome: Progressing  Flowsheets (Taken 1/9/2024 1346)  Free From Fall Injury:   Instruct family/caregiver on patient safety   Based on caregiver fall risk screen, instruct family/caregiver to ask for assistance with transferring infant if caregiver noted to have fall risk factors     Problem: Skin/Tissue Integrity - Adult  Goal: Skin integrity remains intact  1/9/2024 1346 by Sturgeon, Cara B, RN  Outcome: Progressing  Flowsheets (Taken 1/9/2024 1346)  Skin Integrity Remains Intact:   Monitor for areas of redness and/or skin breakdown   Assess vascular access sites hourly     Problem: Skin/Tissue Integrity - Adult  Goal: Incisions, wounds, or drain sites healing without S/S of infection  1/9/2024 1346 by Sturgeon, Cara B, RN  Outcome: Progressing  Flowsheets (Taken 1/9/2024 1346)  Incisions, Wounds, or Drain Sites Healing Without Sign and Symptoms of Infection: TWICE DAILY: Assess and document dressing/incision, wound bed, drain sites and surrounding tissue     Problem: Genitourinary - Adult  Goal: Absence of urinary retention  1/9/2024 1346 by Sturgeon, Cara B, RN  Outcome: Progressing     
  Problem: Discharge Planning  Goal: Discharge to home or other facility with appropriate resources  1/9/2024 2123 by Bell Glass RN  Outcome: Progressing  Flowsheets (Taken 1/9/2024 2123)  Discharge to home or other facility with appropriate resources:   Identify barriers to discharge with patient and caregiver   Identify discharge learning needs (meds, wound care, etc)   Arrange for needed discharge resources and transportation as appropriate   Arrange for interpreters to assist at discharge as needed     Problem: Safety - Adult  Goal: Free from fall injury  1/9/2024 2123 by Bell Glass RN  Outcome: Progressing  Flowsheets (Taken 1/9/2024 2123)  Free From Fall Injury:   Instruct family/caregiver on patient safety   Based on caregiver fall risk screen, instruct family/caregiver to ask for assistance with transferring infant if caregiver noted to have fall risk factors     Problem: Skin/Tissue Integrity - Adult  Goal: Skin integrity remains intact  1/9/2024 2123 by Bell Glass RN  Outcome: Progressing  Flowsheets (Taken 1/9/2024 2123)  Skin Integrity Remains Intact:   Monitor for areas of redness and/or skin breakdown   Assess vascular access sites hourly     Problem: Genitourinary - Adult  Goal: Absence of urinary retention  1/9/2024 2123 by Bell Glass RN  Outcome: Progressing  Flowsheets (Taken 1/9/2024 2123)  Absence of urinary retention:   Assess patient’s ability to void and empty bladder   Monitor intake/output and perform bladder scan as needed   Place urinary catheter per Licensed Independent Practitioner order if needed     Problem: Infection - Adult  Goal: Absence of infection at discharge  1/9/2024 2123 by Bell Glass RN  Outcome: Progressing  Flowsheets (Taken 1/9/2024 2123)  Absence of infection at discharge:   Assess and monitor for signs and symptoms of infection   Monitor lab/diagnostic results   Monitor all insertion sites i.e., indwelling lines, tubes 
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 Reports doing well.  Denies nausea, vomiting. Reports chronic epigastric pain. Follows with Dr. Sneed. Denies constipation or diarrhea. States bowel habits are normal. Denies blood in the stool or dark, tarry stools. Denies new or persistent bone pains. Reports good appetite and energy level. Has lost about 7 lbs intentionally. Denies visual changes or dizziness other than when having a migraine. Reports chronic migraines. Had heart ablation x 2 since last office visit. Has some SOB with activity since heart issues started. Did not complete MRI abdomen due to Covid and colonscopy and EGD cancelled in March due to Covid - now rescheduled for August 2020.        Past Medical History  Patient Active Problem List    Diagnosis Date Noted   • Atypical atrial flutter (CMS/[facility])      Priority: Low     s/p empiric LA roof, anterior mitral isthmus, and cavotricuspid isthmus ablation 4/27/2020     • Dyslipidemia      Priority: Low   • Tachy-reymundo syndrome (CMS/[facility])      Priority: Low     9 sec post conversion pause on loop recorder     • PAF (paroxysmal atrial fibrillation) (CMS/[facility])      Priority: Low   • Benign essential HTN      Priority: Low   • Diastolic CHF (CMS/[facility])      Priority: Low   • Depression      Priority: Low   • Primary cancer of appendix (CMS/[facility]) 04/09/2019     Priority: Low   • NSVT (nonsustained ventricular tachycardia) (CMS/[facility]) 02/15/2019     Priority: Low   • DYLAN on CPAP 12/05/2018     Priority: Low   • Hyperammonemia (CMS/[facility]) 06/07/2014     Priority: Low   • Migraine 04/18/2012     Priority: Low   • Esophageal reflux 07/15/2004     Priority: Low   • Hypothyroidism 07/17/2003     Priority: Low         ALLERGIES:   Allergen Reactions   • Pregabalin Other (See Comments)     Heart failure     • Cephalexin RASH   • Keflex HIVES   • Penicillins HIVES   • Vancomycin HIVES       Current 
Outpatient Medications   Medication Sig Dispense Refill   • metoPROLOL succinate (TOPROL-XL) 100 MG 24 hr tablet Take 1 tablet by mouth nightly. 30 tablet 2   • losartan (COZAAR) 50 MG tablet Take 1 tablet by mouth daily. 90 tablet 3   • spironolactone (ALDACTONE) 25 MG tablet TAKE 1 TABLET BY MOUTH  DAILY 90 tablet 3   • levothyroxine (SYNTHROID, LEVOTHROID) 125 MCG tablet TAKE 1 TABLET BY MOUTH  DAILY 90 tablet 3   • PEG-KCl-NaCl-NaSulf-Na Asc-C (PLENVU) 140 g Recon Soln Take 1 kit by mouth 1 time. See Colonoscopy Instructions 1 each 0   • apixaBAN (ELIQUIS) 5 MG Tab Take 1 tablet by mouth every 12 hours. 180 tablet 2   • pantoprazole (PROTONIX) 40 MG tablet TAKE 1 TABLET BY MOUTH  DAILY 90 tablet 3   • divalproex (DEPAKOTE) 250 MG delayed release EC tablet Take 3 tablets by mouth daily. 30 tablet 0   • topiramate (TOPAMAX) 100 MG tablet      • Galcanezumab-gnlm (EMGALITY) 120 MG/ML Solution Prefilled Syringe Inject 100 mg/mL into the skin every 30 days.     • furosemide (LASIX) 20 MG tablet TAKE 1 TABLET BY MOUTH  DAILY 90 tablet 1   • cycloSPORINE (RESTASIS) 0.05 % ophthalmic emulsion Place 1 drop into both eyes 2 times daily. 3 Bottle 3   • promethazine (PHENERGAN) 50 MG/ML injectable solution Inject 50 mg (1mL) IM every 6-8 hours as needed for nausea. Limit 2 per day and 3 injections per week.     • mometasone (NASONEX) 50 MCG/ACT nasal spray Spray 2 sprays in each nostril daily. 17 g 3   • MULTIPLE VITAMIN PO Take 1 tablet by mouth daily. Take  by mouth daily      • Calcium Carbonate-Vitamin D 600-200 MG-UNIT Cap Take 1 tablet by mouth daily.     • tiZANidine (ZANAFLEX) 4 MG tablet Take 4 mg by mouth every 6 hours as needed.      • selegiline (EMSAM) 12 MG/24HR Place 1 patch onto the skin.     • dexamethasone (DECADRON) 0.75 MG tablet as needed. Once a month as needed     • eletriptan (RELPAX) 40 MG tablet Take 40 mg by mouth as needed for Migraine.      • melatonin 5 MG Take 1 tablet the night of the study   
therapy assess nares and determine need for appliance change or resting period.  Outcome: Progressing   Care plan reviewed with patient.  Patient verbalize understanding of the plan of care and contribute to goal setting.     
  • morphine, IMM REL, (MSIR) 15 MG tablet Take 15 mg by mouth every 6 hours as needed for Pain.        No current facility-administered medications for this visit.        Review of systems: all systems reviewed and negative other than pertinent findings noted in HPI    Performance Status: ECOG 1      OBJECTIVE:    Visit Vitals  BP (!) 140/72   Pulse 98   Temp 98.2 °F (36.8 °C) (Tympanic)   Resp 18   Ht 5' 6\" (1.676 m)   Wt 101.2 kg (223 lb 1.7 oz)   SpO2 95%   BMI 36.01 kg/m²       Physical Exam     General Appearance: Alert and oriented, no acute distress. Well developed.  HEENT: Head - normocephalic, atraumatic. Sclera - anicteric, EOMI.   Lymphatic System: No palpable nodes in neck, axillae, or groin.  Neck:  Supple.   CV: RRR. No murmurs.    Respiratory: Lungs clear to auscultation bilaterally. No wheezing, rhonchi heard.  GI: Abdomen soft, non-tender, no distention. No masses or organomegaly. Bowel sounds were heard in all 4 quadrants.              Back: Symmetric, ROM normal; no spinal tenderness.             Extremities: No peripheral edema.             Neurological:  No sensory or motor deficits.             Skin: Skin color, texture, turgor normal. No rashes or lesions. No petechiae or purpura.      LABS:  Lab Results   Component Value Date    [facility]    NEUABSNM 5.4 07/09/2020    HEMOGLOBIN 12.7 07/09/2020    PLATELETCOUN 368 07/09/2020    NASODIUMSERU 141 07/09/2020    KPOTASSIUMSE 4.5 07/09/2020    MGMAGNESIUMS 2.2 03/10/2020    CREATININESE 0.8 07/09/2020    CALCIUMSERUM 9.0 07/09/2020    ALBUMINSERUM 4.0 07/09/2020    ALANINEAMINO 12 07/09/2020    ASPARTATEAMI 14 07/09/2020    BILIRUBINTOT 0.2 07/09/2020    ALKALINEPHOS 86 07/09/2020         Lab Results   Component Value Date    TBLDCLCT 9.0 07/09/2020    WHTBLDCLCT 7.8 03/10/2020    WHTBLDCLCT 5.5 01/17/2020    WHTBLDCLCT 6.6 11/26/2019    WHTBLDCLCT 7.8 09/24/2019    NEUABSNM 5.4 07/09/2020    NEUABSNM 5.1 03/10/2020    NEUABSNM 
3.3 01/17/2020    NEUABSNM 4.0 11/26/2019    NEUABSNM 5.3 09/24/2019    LYMPHOCYTE 2.7 07/09/2020    LYMPHOCYTE 1.8 03/10/2020    LYMPHOCYTE 1.7 01/17/2020    LYMPHOCYTE 1.8 11/26/2019    LYMPHOCYTE 1.7 09/24/2019    HEMOGLOBIN 12.7 07/09/2020    HEMOGLOBIN 15.1 06/01/2020    HEMOGLOBIN 13.3 03/10/2020    HEMOGLOBIN 12.2 01/17/2020    HEMOGLOBIN 12.1 12/30/2019    MNCRPUSCLVLM 89.2 07/09/2020    MNCRPUSCLVLM 89.7 03/10/2020    MNCRPUSCLVLM 85.9 01/17/2020    MNCRPUSCLVLM 85.9 11/26/2019    MNCRPUSCLVLM 86.9 09/24/2019    PLATELETCOUN 368 07/09/2020    PLATELETCOUN 320 03/10/2020    PLATELETCOUN 317 01/17/2020    PLATELETCOUN 310 11/26/2019    PLATELETCOUN 361 09/24/2019       IMAGING:  None    ASSESSMENT/PLAN:  Appendix cancer, adenocarcinoma, T1Nx, 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 Has had several health changes since last visit but unrelated to cancer. No symptoms suggestive of recurrence at this time. Labs reviewed and are stable. She is overdue for repeat colonoscopy - was scheduled in March but delayed due to Covid - now scheduled for August.     CT scan in Sep 2019 showed possible liver cysts and pulmonary nodules. Will see if she can have MRI  With her pacemaker otherwise will repeat CT abdomen pelvis in March 2020. She was unable to have MRI in March due to Covid-19 and did not have CT scan either. Will have her get CT scan now and determine if MRI is still needed. Will call patient with results and plan of care. Plan to see her back in 6 months or sooner pending CT scan results.          She was diagnosed with atrial fibrillation and is on Eliquis.  She had pacemaker  Implanted in Nov 2019.      Have discussed management. Discussed option of further surgery and shireen; sampling but low likelihood of shireen disease. Discussed another opinion from surgical oncologist and she declined. She elected to forego further surgery.      She had a screening colonoscopy in Dec 2018 and this showed a 
benign polyp arising out of appendix. Subsequently had appendectomy and cecectomy.  Frozen section was benign but final path showed focus of invasive cancer.         Electronically signed by: Yuridia Sahu PA-C  7/14/2020

## 2024-01-10 NOTE — CARE COORDINATION
1/10/24, 2:05 PM EST    Patient goals/plan/ treatment preferences discussed by  and .  Patient goals/plan/ treatment preferences reviewed with patient/ family.  Patient/ family verbalize understanding of discharge plan and are in agreement with goal/plan/treatment preferences.  Understanding was demonstrated using the teach back method.  AVS provided by RN at time of discharge, which includes all necessary medical information pertaining to the patients current course of illness, treatment, post-discharge goals of care, and treatment preferences.     Services At/After Discharge: Assisted Living and In ambulette       IMM Letter  IMM Letter given to Patient/Family/Significant other/Guardian/POA/by:: CHAS  IMM Letter date given:: 01/10/24 (message left for POA, son Hugh at [phone]. calls never returned. Copy left at pt bedside.)  IMM Letter time given:: 1041

## 2024-01-13 LAB
BACTERIA BLD AEROBE CULT: NORMAL
BACTERIA BLD AEROBE CULT: NORMAL

## 2024-06-14 ENCOUNTER — APPOINTMENT (OUTPATIENT)
Dept: GENERAL RADIOLOGY | Age: 89
End: 2024-06-14
Payer: MEDICARE

## 2024-06-14 ENCOUNTER — APPOINTMENT (OUTPATIENT)
Dept: CT IMAGING | Age: 89
End: 2024-06-14
Payer: MEDICARE

## 2024-06-14 ENCOUNTER — HOSPITAL ENCOUNTER (EMERGENCY)
Age: 89
Discharge: HOME | End: 2024-06-14
Attending: EMERGENCY MEDICINE
Payer: MEDICARE

## 2024-06-14 VITALS
BODY MASS INDEX: 22.6 KG/M2 | TEMPERATURE: 97.9 F | HEART RATE: 68 BPM | RESPIRATION RATE: 16 BRPM | OXYGEN SATURATION: 96 % | WEIGHT: 140 LBS | DIASTOLIC BLOOD PRESSURE: 73 MMHG | SYSTOLIC BLOOD PRESSURE: 106 MMHG

## 2024-06-14 DIAGNOSIS — S00.83XA CONTUSION OF FOREHEAD, INITIAL ENCOUNTER: ICD-10-CM

## 2024-06-14 DIAGNOSIS — S09.90XA INJURY OF HEAD, INITIAL ENCOUNTER: ICD-10-CM
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
## 2024-06-14 LAB
ANION GAP SERPL CALC-SCNC: 11 MEQ/L (ref 8–16)
BASOPHILS ABSOLUTE: 0.1 THOU/MM3 (ref 0–0.1)
BASOPHILS NFR BLD AUTO: 0.7 %
BUN SERPL-MCNC: 14 MG/DL (ref 7–22)
CALCIUM SERPL-MCNC: 9.2 MG/DL (ref 8.5–10.5)
CHLORIDE SERPL-SCNC: 103 MEQ/L (ref 98–111)
CO2 SERPL-SCNC: 24 MEQ/L (ref 23–33)
CREAT SERPL-MCNC: 0.9 MG/DL (ref 0.4–1.2)
DEPRECATED RDW RBC AUTO: 43.1 FL (ref 35–45)
EOSINOPHIL NFR BLD AUTO: 3.2 %
EOSINOPHILS ABSOLUTE: 0.3 THOU/MM3 (ref 0–0.4)
ERYTHROCYTE [DISTWIDTH] IN BLOOD BY AUTOMATED COUNT: 12.9 % (ref 11.5–14.5)
GFR SERPL CREATININE-BSD FRML MDRD: 60 ML/MIN/1.73M2
GLUCOSE SERPL-MCNC: 154 MG/DL (ref 70–108)
HCT VFR BLD AUTO: 39.8 % (ref 37–47)
HGB BLD-MCNC: 13 GM/DL (ref 12–16)
IMM GRANULOCYTES # BLD AUTO: 0.06 THOU/MM3 (ref 0–0.07)
IMM GRANULOCYTES NFR BLD AUTO: 0.7 %
LYMPHOCYTES ABSOLUTE: 1.5 THOU/MM3 (ref 1–4.8)
LYMPHOCYTES NFR BLD AUTO: 17.4 %
MAGNESIUM SERPL-MCNC: 2.2 MG/DL (ref 1.6–2.4)
MCH RBC QN AUTO: 29.8 PG (ref 26–33)
MCHC RBC AUTO-ENTMCNC: 32.7 GM/DL (ref 32.2–35.5)
MCV RBC AUTO: 91.3 FL (ref 81–99)
MONOCYTES ABSOLUTE: 0.9 THOU/MM3 (ref 0.4–1.3)
MONOCYTES NFR BLD AUTO: 10.2 %
NEUTROPHILS ABSOLUTE: 5.7 THOU/MM3 (ref 1.8–7.7)
NEUTROPHILS NFR BLD AUTO: 67.8 %
NRBC BLD AUTO-RTO: 0 /100 WBC
OSMOLALITY SERPL CALC.SUM OF ELEC: 279.2 MOSMOL/KG (ref 275–300)
PLATELET # BLD AUTO: 227 THOU/MM3 (ref 130–400)
PMV BLD AUTO: 8.7 FL (ref 9.4–12.4)
POTASSIUM SERPL-SCNC: 3.7 MEQ/L (ref 3.5–5.2)
RBC # BLD AUTO: 4.36 MILL/MM3 (ref 4.2–5.4)
SODIUM SERPL-SCNC: 138 MEQ/L (ref 135–145)
WBC # BLD AUTO: 8.4 THOU/MM3 (ref 4.8–10.8)

## 2024-06-14 PROCEDURE — 6370000000 HC RX 637 (ALT 250 FOR IP)

## 2024-06-14 PROCEDURE — 72125 CT NECK SPINE W/O DYE: CPT

## 2024-06-14 PROCEDURE — 72170 X-RAY EXAM OF PELVIS: CPT

## 2024-06-14 PROCEDURE — 70450 CT HEAD/BRAIN W/O DYE: CPT

## 2024-06-14 PROCEDURE — 73030 X-RAY EXAM OF SHOULDER: CPT

## 2024-06-14 PROCEDURE — 83735 ASSAY OF MAGNESIUM: CPT

## 2024-06-14 PROCEDURE — 85025 COMPLETE CBC W/AUTO DIFF WBC: CPT

## 2024-06-14 PROCEDURE — 99284 EMERGENCY DEPT VISIT MOD MDM: CPT

## 2024-06-14 PROCEDURE — 36415 COLL VENOUS BLD VENIPUNCTURE: CPT

## 2024-06-14 PROCEDURE — 80048 BASIC METABOLIC PNL TOTAL CA: CPT

## 2024-06-14 RX ORDER — GINSENG 100 MG
CAPSULE ORAL ONCE
Status: COMPLETED | OUTPATIENT
Start: 2024-06-14 | End: 2024-06-14

## 2024-06-14 RX ADMIN — BACITRACIN: 500 OINTMENT TOPICAL at 15:58

## 2024-06-14 ASSESSMENT — PAIN - FUNCTIONAL ASSESSMENT
PAIN_FUNCTIONAL_ASSESSMENT: NONE - DENIES PAIN

## 2024-06-14 NOTE — ED NOTES
RN to room to round on patient, pt remains pleasantly confused, turned TV on and lights down  Posey in place for fall risk  Patient resting in bed. Respirations easy and unlabored. No distress noted. Call light within reach.

## 2024-06-14 NOTE — ED PROVIDER NOTES
GASTROSTOMY TUBE PLACEMENT  5/15    HYSTERECTOMY (CERVIX STATUS UNKNOWN)  1989    UPPER GASTROINTESTINAL ENDOSCOPY           MEDICATIONS   No current facility-administered medications for this encounter.    Current Outpatient Medications:     Naproxen Sodium 220 MG CAPS, Take 1 tablet by mouth 2 times daily as needed for Pain, Disp: 60 capsule, Rfl: 1    amLODIPine (NORVASC) 5 MG tablet, Take 1 tablet by mouth daily, Disp: 30 tablet, Rfl: 3    acetaminophen (TYLENOL) 325 MG tablet, Take 1 tablet by mouth in the morning and at bedtime, Disp: , Rfl:     Donepezil HCl (ARICEPT) 23 MG TABS tablet, Take 1 tablet by mouth nightly, Disp: , Rfl:     carboxymethylcellulose PF (LUBRICANT EYE DROPS PF) 0.5 % SOLN ophthalmic solution, Place 1 drop into the left eye 4 times daily, Disp: , Rfl:     oxyBUTYnin (DITROPAN) 5 MG tablet, Take 1 tablet by mouth 2 times daily, Disp: , Rfl:     QUEtiapine (SEROQUEL) 100 MG tablet, Take 1 tablet by mouth nightly, Disp: , Rfl:     traZODone (DESYREL) 50 MG tablet, Take 1 tablet by mouth nightly, Disp: , Rfl:     acetaminophen (TYLENOL) 325 MG tablet, Take 2 tablets by mouth every 4 hours as needed for Pain or Fever, Disp: , Rfl:     Calcium Carbonate Antacid (TUMS ULTRA 1000 PO), Take 2 tablets by mouth as needed (heartburn), Disp: , Rfl:     nystatin (MYCOSTATIN) POWD powder, Apply 1 each topically 3 times daily, Disp: , Rfl:     QUEtiapine (SEROQUEL) 50 MG tablet, Take 1 tablet by mouth every morning, Disp: 60 tablet, Rfl: 3    atorvastatin (LIPITOR) 10 MG tablet, Take 1 tablet by mouth nightly, Disp: , Rfl:     LACTOBACILLUS PO, Take 1 tablet by mouth daily, Disp: , Rfl:     metoprolol (LOPRESSOR) 25 MG tablet, Take 1 tablet by mouth daily, Disp: , Rfl:     senna (SENOKOT) 8.6 MG tablet, Take 2 tablets by mouth nightly, Disp: , Rfl:     vitamin D (CHOLECALCIFEROL) 400 UNITS TABS tablet, Take 1 tablet by mouth daily, Disp: , Rfl:     Cranberry 450 MG CAPS, Take 1 tablet by mouth  interpretation may not be available as of the writing of this note.      PREVIOUS RECORDS  AND EXTERNAL INFORMATION REVIEWED   History obtained from: the patient.  Pertinent previous and/or external records reviewed: Noncontributory.  Case discussed with specialties other than Emergency Medicine: Not Applicable      MEDICAL DECISION MAKING   Initial plan:  CBC, BMP, Mg  XR pelvis, XR R-shoulder  CT head and C-spine  Bacitracin for L-forehead abrasion    Comorbid conditions pertinent to this ED encounter:  Dementia, CVA, HTN, HLD      Differential Diagnosis includes but is not limited to:  Closed head injury, concussion, fracture, sprain, strain, dislocation     Decision Rules/Clinical Scores utilized:  Not Applicable    Code Status:  Not addressed during this ED visit    Social determinants of health impacting treatment or disposition:  Considered and not applicable     MIPS documentation:  N/A    Medical Decision Making  89F with PMH significant for dementia, CVA, HTN, HLD who presents to the ED for evaluation s/p fall from wheelchair while seated onto L-frontal region with unknown LOC.  History slightly limited due to underlying dementia.  Vitals stable.  Physical exam reveals L-frontal abrasion with small underlying scalp hematoma as well as tenderness over R-shoulder and L-pelvis.  Labs and imaging results discussed in ED course.  Refer to ED course for additional information.  Bacitracin ointment applied to L-frontal abrasion.  Patient ultimately discharged in stable condition.    ED COURSE   ED Medications administered this visit (None if left blank):   Medications   bacitracin ointment ( Topical Given 6/14/24 1558)       ED Course as of 06/16/24 1338   Fri Jun 14, 2024   1249 WBC: 8.4 [MA]   1249 Hemoglobin Quant: 13.0 [MA]   1249 No significant electrolyte abnormalities on BMP [MA]   1250 Magnesium: 2.2 [MA]   1355 CT CERVICAL SPINE WO CONTRAST  Radiology read:   1. No acute fracture of the cervical

## 2024-06-14 NOTE — DISCHARGE INSTRUCTIONS
You were seen in the ED after a fall onto forehead. Imaging and labs were unremarkable for any acute findings. Follow up with PCP as needed for further medical management.    Continue to take your medications as indicated and prescribed.  For pain use acetaminophen (Tylenol).  You can take over the counter acetaminophen tablets (1 - 2 tablets of the 500-mg strength every 6 hours).  Do not take any medication that contains aspirin / ibuprofen or blood thinning properties until seen by your physician.  Do not do any sporting activity (running, playing basketball / football, etc) until you are seen by your physician.    For persistent headache you can try sitting in a cool, dark room and try taking 1 - 2 tabs (25 - 50 mg) of diphenhydramine (Benadryl) to help you relax.    PLEASE RETURN TO THE EMERGENCY DEPARTMENT IMMEDIATELY for worsening symptoms, persistent headache, change in vision / hearing / taste, ringing in your ears, sleeping more than normal, or if you develop any concerning symptoms such as: high fever not relieved by acetaminophen (Tylenol) and/or ibuprofen (Motrin / Advil), chills, shortness of breath, chest pain, feeling of your heart fluttering or racing, persistent nausea and/or vomiting, vomiting up blood, blood in your stool, loss of consciousness, numbness, weakness or tingling in the arms or legs or change in color of the extremities, changes in mental status, blurry vision, loss of bladder / bowel control, unable to follow up with your physician, or other any other care or concern.

## 2024-06-14 NOTE — ED NOTES
Pt presents to the ED from the nursing home after pt was sitting in recliner and fell forward. Pt has abrasion and hematoma noted to forehead with no active bleeding. Pt has hx of dementia and oriented times 3. Pt respirations are even and unlabored. Pt denies any pain. Posey alarm in place.